# Patient Record
Sex: FEMALE | Race: WHITE | NOT HISPANIC OR LATINO | Employment: FULL TIME | URBAN - METROPOLITAN AREA
[De-identification: names, ages, dates, MRNs, and addresses within clinical notes are randomized per-mention and may not be internally consistent; named-entity substitution may affect disease eponyms.]

---

## 2024-06-26 ENCOUNTER — HOSPITAL ENCOUNTER (EMERGENCY)
Facility: HOSPITAL | Age: 49
Discharge: HOME | End: 2024-06-26
Attending: EMERGENCY MEDICINE
Payer: COMMERCIAL

## 2024-06-26 ENCOUNTER — APPOINTMENT (OUTPATIENT)
Dept: CARDIOLOGY | Facility: HOSPITAL | Age: 49
End: 2024-06-26
Payer: COMMERCIAL

## 2024-06-26 ENCOUNTER — APPOINTMENT (OUTPATIENT)
Dept: RADIOLOGY | Facility: HOSPITAL | Age: 49
End: 2024-06-26
Payer: COMMERCIAL

## 2024-06-26 VITALS
HEART RATE: 77 BPM | TEMPERATURE: 97.5 F | DIASTOLIC BLOOD PRESSURE: 70 MMHG | OXYGEN SATURATION: 98 % | SYSTOLIC BLOOD PRESSURE: 132 MMHG | RESPIRATION RATE: 16 BRPM

## 2024-06-26 DIAGNOSIS — R07.89 CHEST WALL PAIN: Primary | ICD-10-CM

## 2024-06-26 LAB
ATRIAL RATE: 69 BPM
P AXIS: 77 DEGREES
P OFFSET: 209 MS
P ONSET: 159 MS
PR INTERVAL: 130 MS
Q ONSET: 224 MS
QRS COUNT: 11 BEATS
QRS DURATION: 72 MS
QT INTERVAL: 390 MS
QTC CALCULATION(BAZETT): 417 MS
QTC FREDERICIA: 408 MS
R AXIS: 82 DEGREES
T AXIS: 73 DEGREES
T OFFSET: 419 MS
VENTRICULAR RATE: 69 BPM

## 2024-06-26 PROCEDURE — 71046 X-RAY EXAM CHEST 2 VIEWS: CPT | Performed by: RADIOLOGY

## 2024-06-26 PROCEDURE — 71046 X-RAY EXAM CHEST 2 VIEWS: CPT

## 2024-06-26 PROCEDURE — 99283 EMERGENCY DEPT VISIT LOW MDM: CPT | Mod: 25

## 2024-06-26 PROCEDURE — 93005 ELECTROCARDIOGRAM TRACING: CPT

## 2024-06-26 RX ORDER — IBUPROFEN 600 MG/1
600 TABLET ORAL EVERY 6 HOURS PRN
Qty: 20 TABLET | Refills: 0 | Status: SHIPPED | OUTPATIENT
Start: 2024-06-26 | End: 2024-07-03

## 2024-06-26 RX ORDER — LIDOCAINE 50 MG/G
1 PATCH TOPICAL DAILY
Qty: 7 PATCH | Refills: 0 | Status: SHIPPED | OUTPATIENT
Start: 2024-06-26

## 2024-06-26 ASSESSMENT — COLUMBIA-SUICIDE SEVERITY RATING SCALE - C-SSRS
1. IN THE PAST MONTH, HAVE YOU WISHED YOU WERE DEAD OR WISHED YOU COULD GO TO SLEEP AND NOT WAKE UP?: NO
2. HAVE YOU ACTUALLY HAD ANY THOUGHTS OF KILLING YOURSELF?: NO
6. HAVE YOU EVER DONE ANYTHING, STARTED TO DO ANYTHING, OR PREPARED TO DO ANYTHING TO END YOUR LIFE?: NO

## 2024-06-26 ASSESSMENT — LIFESTYLE VARIABLES
TOTAL SCORE: 0
HAVE YOU EVER FELT YOU SHOULD CUT DOWN ON YOUR DRINKING: NO
HAVE PEOPLE ANNOYED YOU BY CRITICIZING YOUR DRINKING: NO
EVER FELT BAD OR GUILTY ABOUT YOUR DRINKING: NO
EVER HAD A DRINK FIRST THING IN THE MORNING TO STEADY YOUR NERVES TO GET RID OF A HANGOVER: NO

## 2024-06-26 NOTE — DISCHARGE INSTRUCTIONS
Be sure to follow up as directed in 1-2 days.  All of the details of your follow up instructions are detailed in the follow up section of this packet.     Follow-up with family doctor for further management    It is important to remember that your care does not end here and you must continue to monitor your condition closely. Please return to the emergency department for any worsening or concerning signs or symptoms as directed by our conversations and the discharge instructions. Otherwise please follow up with your doctor in 2 days if no better or worse. If you do not have a doctor please contact the referral number on your discharge instructions. Please contact any physician specialists provided in your discharge notes as it is very important to follow up with them regarding your condition. If you are unable to reach the physicians provided, please come back to the Emergency Department at any time.        Return to emergency room without delay for ANY new or worsening pains or for any other symptoms or concerns.

## 2024-06-26 NOTE — ED PROVIDER NOTES
HPI   Chief Complaint   Patient presents with    Rib Injury     HPI:  The patient is a 49-year-old female here for evaluation of some left-sided chest wall pain has been going on for 5 or 6 months intermittently.  She says that it is positional reproducible, raising her arms and lifting in particular heavy things causes the pain to be more intense, positioning and position of comfort makes the symptoms better.  Does not feel short of breath, no specific injury or trauma.  No numbness or tingling or rashing appreciated.  No other associated complaints                      [place] Coma Scale Score: 15                     Patient History   History reviewed. No pertinent past medical history.  History reviewed. No pertinent surgical history.  No family history on file.  Social History     Tobacco Use    Smoking status: Unknown    Smokeless tobacco: Not on file   Substance Use Topics    Alcohol use: Not on file    Drug use: Not on file       Physical Exam   ED Triage Vitals   Temp Pulse Resp BP   -- -- -- --      SpO2 Temp src Heart Rate Source Patient Position   -- -- -- --      BP Location FiO2 (%)     -- --       Physical Exam  PHYSICAL EXAMINATION    GENERAL APPEARANCE: Awake and alert.     VITAL SIGNS: As per the nurses' triage record.     HEENT: Normocephalic, atraumatic. Extraocular muscles are intact. Pupils equal round and reactive to light. Conjunctiva are pink. Negative scleral icterus. Mucous membranes are moist. Tongue in the midline. Pharynx was without erythema or exudates, uvula midline    NECK: Soft Nontender and supple, full gross ROM, no meningeal signs.    CHEST: Not particularly tender, however when the patient lifts her arms up she says that she can feel a reproducible type pain to the left chest wall.  There is no anterior pain this is more in the mid to anterior axillary line region.    HEART: S1, S2. Regular rate and rhythm. No murmurs, gallops or rubs.  Strong and equal pulses in the  extremities.     ABDOMEN: Soft, nontender, nondistended, positive bowel sounds, no palpable masses.    MUSCULOSKELETAL: The calves are nontender to palpation. Full gross active range of motion. Ambulating on own with no acute difficulties     NEUROLOGICAL: Awake, alert and oriented x 3. Power intact in the upper and lower extremities. Sensation is intact to light touch in the upper and lower extremities.     IMMUNOLOGICAL: No lymphatic streaking noted     DERM: No petechiae, rashes, or ecchymoses.  No vesicular lesions  ED Course & MDM   ED Course as of 06/26/24 1141   Wed Jun 26, 2024   1043 EKG on my independent interpretation:  Normal sinus rhythm 69 beats per minute, normal axis, normal intervals, no acute ST or T-wave abnormalities.  No prior EKG for comparison. [BRE]      ED Course User Index  [BRE] Rut Gibbons MD         Diagnoses as of 06/26/24 1141   Chest wall pain       Medical Decision Making  Parts of this chart have been completed using voice recognition software. Please excuse any errors of transcription.  My thought process and reason for plan has been formulated from the time that I saw the patient until the time of disposition and is not specific to one specific moment during their visit and furthermore my MDM encompasses this entire chart and not only this text box.      HPI: Detailed above.    Exam: A medically appropriate exam performed, outlined above, given the known history and presentation.    History Limited by: Nothing    History obtained from: The patient    External/internal records reviewed: No external records reviewed    Social Determinants of Health considered during this visit: Lives at home    Chronic conditions impacting care: Denies    Medications given during visit:  Medications - No data to display     Diagnostic/tests  Labs Reviewed - No data to display   XR chest 2 views   Final Result   No acute cardiopulmonary process.        MACRO:   None        Signed by: Massiel  Renetta 6/26/2024 11:10 AM   Dictation workstation:   [id number]           EKG: Obtained read by the attending physician reviewed by myself and per my interpretation no sign of STEMI criteria    Diagnostic tests considered but not performed: I consider doing laboratory studies however do not feel is warranted today, the patient has had months of symptoms that are positional and reproducible.  EKG nonacute, I do not believe troponin would be of high benefit as patient's not particular complaining of primary unchanging chest pain, this is more of a positional reproducible type discomfort.    Case discussed with: Tending physician    Considerations/further MDM:  The patient has positional reproducible left-sided chest wall pain.  Patient has a nonacute EKG, no significant sign of STEMI or ST changes, chest x-ray read by radiologist reviewed by myself nonacute.  Vital signs largely unremarkable.  Return precautions follow-up instructions discussed.    Patient has been seen in conjunction with attending physician Dr. phillips      Procedure  Procedures     Neri Zendejas PA-C  06/26/24 1144

## 2024-07-30 ENCOUNTER — APPOINTMENT (OUTPATIENT)
Dept: RADIOLOGY | Facility: HOSPITAL | Age: 49
End: 2024-07-30
Payer: COMMERCIAL